# Patient Record
Sex: FEMALE | Race: BLACK OR AFRICAN AMERICAN | NOT HISPANIC OR LATINO | URBAN - METROPOLITAN AREA
[De-identification: names, ages, dates, MRNs, and addresses within clinical notes are randomized per-mention and may not be internally consistent; named-entity substitution may affect disease eponyms.]

---

## 2017-02-11 ENCOUNTER — EMERGENCY (EMERGENCY)
Facility: HOSPITAL | Age: 62
LOS: 1 days | Discharge: HOME | End: 2017-02-11
Attending: EMERGENCY MEDICINE | Admitting: EMERGENCY MEDICINE
Payer: COMMERCIAL

## 2017-02-11 VITALS
RESPIRATION RATE: 18 BRPM | DIASTOLIC BLOOD PRESSURE: 104 MMHG | TEMPERATURE: 99 F | OXYGEN SATURATION: 98 % | SYSTOLIC BLOOD PRESSURE: 153 MMHG | HEART RATE: 83 BPM

## 2017-02-11 VITALS
SYSTOLIC BLOOD PRESSURE: 117 MMHG | RESPIRATION RATE: 18 BRPM | DIASTOLIC BLOOD PRESSURE: 74 MMHG | OXYGEN SATURATION: 100 % | HEART RATE: 74 BPM | TEMPERATURE: 98 F

## 2017-02-11 DIAGNOSIS — Z90.711 ACQUIRED ABSENCE OF UTERUS WITH REMAINING CERVICAL STUMP: Chronic | ICD-10-CM

## 2017-02-11 DIAGNOSIS — Z98.891 HISTORY OF UTERINE SCAR FROM PREVIOUS SURGERY: Chronic | ICD-10-CM

## 2017-02-11 PROBLEM — Z00.00 ENCOUNTER FOR PREVENTIVE HEALTH EXAMINATION: Status: ACTIVE | Noted: 2017-02-11

## 2017-02-11 LAB
ALBUMIN SERPL ELPH-MCNC: 4.5 G/DL — SIGNIFICANT CHANGE UP (ref 3.3–5)
ALP SERPL-CCNC: 92 U/L — SIGNIFICANT CHANGE UP (ref 40–120)
ALT FLD-CCNC: 22 U/L — SIGNIFICANT CHANGE UP (ref 4–33)
AST SERPL-CCNC: 23 U/L — SIGNIFICANT CHANGE UP (ref 4–32)
BASE EXCESS BLDV CALC-SCNC: 6.8 MMOL/L — SIGNIFICANT CHANGE UP
BASOPHILS # BLD AUTO: 0.03 K/UL — SIGNIFICANT CHANGE UP (ref 0–0.2)
BASOPHILS NFR BLD AUTO: 0.5 % — SIGNIFICANT CHANGE UP (ref 0–2)
BILIRUB SERPL-MCNC: 0.3 MG/DL — SIGNIFICANT CHANGE UP (ref 0.2–1.2)
BLOOD GAS VENOUS - CREATININE: 0.82 MG/DL — SIGNIFICANT CHANGE UP (ref 0.5–1.3)
BUN SERPL-MCNC: 9 MG/DL — SIGNIFICANT CHANGE UP (ref 7–23)
CALCIUM SERPL-MCNC: 9.6 MG/DL — SIGNIFICANT CHANGE UP (ref 8.4–10.5)
CHLORIDE BLDV-SCNC: 104 MMOL/L — SIGNIFICANT CHANGE UP (ref 96–108)
CHLORIDE SERPL-SCNC: 101 MMOL/L — SIGNIFICANT CHANGE UP (ref 98–107)
CK MB BLD-MCNC: 2.39 NG/ML — SIGNIFICANT CHANGE UP (ref 1–4.7)
CK SERPL-CCNC: 228 U/L — HIGH (ref 25–170)
CO2 SERPL-SCNC: 27 MMOL/L — SIGNIFICANT CHANGE UP (ref 22–31)
CREAT SERPL-MCNC: 0.81 MG/DL — SIGNIFICANT CHANGE UP (ref 0.5–1.3)
EOSINOPHIL # BLD AUTO: 0.21 K/UL — SIGNIFICANT CHANGE UP (ref 0–0.5)
EOSINOPHIL NFR BLD AUTO: 3.8 % — SIGNIFICANT CHANGE UP (ref 0–6)
GAS PNL BLDV: 137 MMOL/L — SIGNIFICANT CHANGE UP (ref 136–146)
GLUCOSE BLDV-MCNC: 98 — SIGNIFICANT CHANGE UP (ref 70–99)
GLUCOSE SERPL-MCNC: 104 MG/DL — HIGH (ref 70–99)
HCO3 BLDV-SCNC: 27 MMOL/L — SIGNIFICANT CHANGE UP (ref 20–27)
HCT VFR BLD CALC: 41.1 % — SIGNIFICANT CHANGE UP (ref 34.5–45)
HCT VFR BLDV CALC: 43 % — SIGNIFICANT CHANGE UP (ref 34.5–45)
HGB BLD-MCNC: 13.6 G/DL — SIGNIFICANT CHANGE UP (ref 11.5–15.5)
HGB BLDV-MCNC: 14 G/DL — SIGNIFICANT CHANGE UP (ref 11.5–15.5)
IMM GRANULOCYTES NFR BLD AUTO: 0.2 % — SIGNIFICANT CHANGE UP (ref 0–1.5)
LACTATE BLDV-MCNC: 1 MMOL/L — SIGNIFICANT CHANGE UP (ref 0.5–2)
LYMPHOCYTES # BLD AUTO: 1.63 K/UL — SIGNIFICANT CHANGE UP (ref 1–3.3)
LYMPHOCYTES # BLD AUTO: 29.5 % — SIGNIFICANT CHANGE UP (ref 13–44)
MCHC RBC-ENTMCNC: 31.3 PG — SIGNIFICANT CHANGE UP (ref 27–34)
MCHC RBC-ENTMCNC: 33.1 % — SIGNIFICANT CHANGE UP (ref 32–36)
MCV RBC AUTO: 94.5 FL — SIGNIFICANT CHANGE UP (ref 80–100)
MONOCYTES # BLD AUTO: 0.42 K/UL — SIGNIFICANT CHANGE UP (ref 0–0.9)
MONOCYTES NFR BLD AUTO: 7.6 % — SIGNIFICANT CHANGE UP (ref 2–14)
NEUTROPHILS # BLD AUTO: 3.22 K/UL — SIGNIFICANT CHANGE UP (ref 1.8–7.4)
NEUTROPHILS NFR BLD AUTO: 58.4 % — SIGNIFICANT CHANGE UP (ref 43–77)
PCO2 BLDV: 60 MMHG — HIGH (ref 41–51)
PH BLDV: 7.35 PH — SIGNIFICANT CHANGE UP (ref 7.32–7.43)
PLATELET # BLD AUTO: 326 K/UL — SIGNIFICANT CHANGE UP (ref 150–400)
PMV BLD: 10.1 FL — SIGNIFICANT CHANGE UP (ref 7–13)
PO2 BLDV: < 24 MMHG — LOW (ref 35–40)
POTASSIUM BLDV-SCNC: 4 MMOL/L — SIGNIFICANT CHANGE UP (ref 3.4–4.5)
POTASSIUM SERPL-MCNC: 4.2 MMOL/L — SIGNIFICANT CHANGE UP (ref 3.5–5.3)
POTASSIUM SERPL-SCNC: 4.2 MMOL/L — SIGNIFICANT CHANGE UP (ref 3.5–5.3)
PROT SERPL-MCNC: 7.5 G/DL — SIGNIFICANT CHANGE UP (ref 6–8.3)
RBC # BLD: 4.35 M/UL — SIGNIFICANT CHANGE UP (ref 3.8–5.2)
RBC # FLD: 13.4 % — SIGNIFICANT CHANGE UP (ref 10.3–14.5)
SAO2 % BLDV: 17.5 % — LOW (ref 60–85)
SODIUM SERPL-SCNC: 141 MMOL/L — SIGNIFICANT CHANGE UP (ref 135–145)
TROPONIN T SERPL-MCNC: < 0.06 NG/ML — SIGNIFICANT CHANGE UP (ref 0–0.06)
TROPONIN T SERPL-MCNC: < 0.06 NG/ML — SIGNIFICANT CHANGE UP (ref 0–0.06)
WBC # BLD: 5.52 K/UL — SIGNIFICANT CHANGE UP (ref 3.8–10.5)
WBC # FLD AUTO: 5.52 K/UL — SIGNIFICANT CHANGE UP (ref 3.8–10.5)

## 2017-02-11 PROCEDURE — 99285 EMERGENCY DEPT VISIT HI MDM: CPT | Mod: 25

## 2017-02-11 PROCEDURE — 71020: CPT | Mod: 26

## 2017-02-11 PROCEDURE — 93010 ELECTROCARDIOGRAM REPORT: CPT

## 2017-02-11 RX ORDER — ASPIRIN/CALCIUM CARB/MAGNESIUM 324 MG
162 TABLET ORAL ONCE
Qty: 0 | Refills: 0 | Status: COMPLETED | OUTPATIENT
Start: 2017-02-11 | End: 2017-02-11

## 2017-02-11 RX ORDER — ASPIRIN/CALCIUM CARB/MAGNESIUM 324 MG
162 TABLET ORAL ONCE
Qty: 0 | Refills: 0 | Status: DISCONTINUED | OUTPATIENT
Start: 2017-02-11 | End: 2017-02-11

## 2017-02-11 RX ADMIN — Medication 162 MILLIGRAM(S): at 17:03
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
## 2017-02-11 NOTE — ED PROVIDER NOTE - CARE PLAN
Principal Discharge DX:	Upper respiratory infection  Instructions for follow-up, activity and diet:	Rest, drink plenty of fluids.  Advance activity as tolerated.  Continue all previously prescribed medications as directed.  Follow up with your primary care physician and cardiology (referral list provided) in 48-72 hours- bring copies of your results.  Return to the ER for worsening or persistent symptoms, including but not limited to shortness of breath, wheezing, worsening/persistent chest pain and/or ANY NEW OR CONCERNING SYMPTOMS. If you have issues obtaining follow up, please call: [phone] to obtain a doctor or specialist who takes your insurance in your area.  Secondary Diagnosis:	Chest pain Principal Discharge DX:	Upper respiratory infection  Instructions for follow-up, activity and diet:	Rest, drink plenty of fluids.  Advance activity as tolerated.  Continue all previously prescribed medications as directed.  Follow up with your primary care physician and cardiology (referral list provided) in 48-72 hours- bring copies of your results.  Return to the ER for worsening or persistent symptoms, including but not limited to shortness of breath, wheezing, worsening/persistent chest pain and/or ANY NEW OR CONCERNING SYMPTOMS. If you have issues obtaining follow up, please call: [phone] to obtain a doctor or specialist who takes your insurance in your area.  Secondary Diagnosis:	Chest pain

## 2017-02-11 NOTE — ED PROVIDER NOTE - CHPI ED SYMPTOMS NEG
no diaphoresis/no headache/no palpitations, lightheadedness, nausea,, no vomiting, no back pain, no neck pain, no arm pain, no calf pain or swelling/no shortness of breath/no fever/no hemoptysis/no chills

## 2017-02-11 NOTE — ED PROVIDER NOTE - MEDICAL DECISION MAKING DETAILS
60 y/o F pt non smoker with PMHx of HLD, PSHx of  section, partial hysterectomy, FHx of MI (father at 61) presents with cough, runny nose x1 week. Likely viral syndrome. Heart score of 2, Plan: labs troponin, repeat troponin in 4 hours, EKG, CXR, cardiology f/u as outpatient.

## 2017-02-11 NOTE — ED PROVIDER NOTE - PLAN OF CARE
Rest, drink plenty of fluids.  Advance activity as tolerated.  Continue all previously prescribed medications as directed.  Follow up with your primary care physician and cardiology (referral list provided) in 48-72 hours- bring copies of your results.  Return to the ER for worsening or persistent symptoms, including but not limited to shortness of breath, wheezing, worsening/persistent chest pain and/or ANY NEW OR CONCERNING SYMPTOMS. If you have issues obtaining follow up, please call: [phone] to obtain a doctor or specialist who takes your insurance in your area.

## 2017-02-11 NOTE — ED PROVIDER NOTE - PROGRESS NOTE DETAILS
TOMER Reeves:  CXR shows no acute pathology.  Cardiac enzyme negative x 2.  Pt medically stable for discharge.  Pt to follow up with PMD and cardiology (referral list provided). The scribe's documentation has been prepared under my direction and personally reviewed by me in its entirety. I confirm that the note above accurately reflects all work, treatment, procedures, and medical decision making performed by me, Oz Reeves PA-C.

## 2017-02-11 NOTE — ED PROVIDER NOTE - NS ED MD SCRIBE ATTENDING SCRIBE SECTIONS
DISPOSITION/PHYSICAL EXAM/VITAL SIGNS( Pullset)/PAST MEDICAL/SURGICAL/SOCIAL HISTORY/HISTORY OF PRESENT ILLNESS/REVIEW OF SYSTEMS

## 2017-02-11 NOTE — ED PROVIDER NOTE - ATTENDING CONTRIBUTION TO CARE
Dr Darling  62yo F hx of HLD pw a month of cough. States "I have been sick for over a month" initially productive cough now coughing w "little" sputum. no fever or chills. +nasal congestion, runny nose. saw pmd told to have a cxr, has been on zithromax and albuterol.   also complaining of midsternal chest pain, non radiating. not exertion. not associated with nausea/vomit/ numbness or weakness. last stress test was 4-5 years ago, "normal"   no recent travel hx. no leg swelling or calf pain. non smoker. +family hx of MI father.  Vital signs noted. pt no acute distress. normal S1-S2 coarse bs bl. no retractions. talks in full clear sentences. soft nt abdomen. no pedal edema. no calf tenderness. normal pulses bilateral feet.   plan ekg, cxr, labs, troponin x 2, re assess

## 2017-02-11 NOTE — ED ADULT TRIAGE NOTE - CHIEF COMPLAINT QUOTE
Pt on azithromycin for cold symptoms since Wednesday. Pt also taking mucinex, delsum for cold symptoms as well. C/o BP being high. No pmh of HTN. Pt also has burst blood vessel in right eye.

## 2017-08-21 ENCOUNTER — EMERGENCY (EMERGENCY)
Facility: HOSPITAL | Age: 62
LOS: 1 days | Discharge: HOME | End: 2017-08-21
Attending: EMERGENCY MEDICINE | Admitting: EMERGENCY MEDICINE
Payer: COMMERCIAL

## 2017-08-21 VITALS
TEMPERATURE: 99 F | RESPIRATION RATE: 18 BRPM | SYSTOLIC BLOOD PRESSURE: 124 MMHG | DIASTOLIC BLOOD PRESSURE: 84 MMHG | WEIGHT: 214.07 LBS | HEART RATE: 94 BPM | OXYGEN SATURATION: 97 %

## 2017-08-21 DIAGNOSIS — Z98.891 HISTORY OF UTERINE SCAR FROM PREVIOUS SURGERY: Chronic | ICD-10-CM

## 2017-08-21 DIAGNOSIS — N60.01 SOLITARY CYST OF RIGHT BREAST: Chronic | ICD-10-CM

## 2017-08-21 DIAGNOSIS — Z90.711 ACQUIRED ABSENCE OF UTERUS WITH REMAINING CERVICAL STUMP: Chronic | ICD-10-CM

## 2017-08-21 LAB
ALBUMIN SERPL ELPH-MCNC: 4.3 G/DL — SIGNIFICANT CHANGE UP (ref 3.3–5)
ALP SERPL-CCNC: 94 U/L — SIGNIFICANT CHANGE UP (ref 40–120)
ALT FLD-CCNC: 17 U/L RC — SIGNIFICANT CHANGE UP (ref 10–45)
ANION GAP SERPL CALC-SCNC: 12 MMOL/L — SIGNIFICANT CHANGE UP (ref 5–17)
AST SERPL-CCNC: 20 U/L — SIGNIFICANT CHANGE UP (ref 10–40)
BASOPHILS # BLD AUTO: 0.1 K/UL — SIGNIFICANT CHANGE UP (ref 0–0.2)
BASOPHILS NFR BLD AUTO: 1.1 % — SIGNIFICANT CHANGE UP (ref 0–2)
BILIRUB SERPL-MCNC: 0.2 MG/DL — SIGNIFICANT CHANGE UP (ref 0.2–1.2)
BUN SERPL-MCNC: 9 MG/DL — SIGNIFICANT CHANGE UP (ref 7–23)
CALCIUM SERPL-MCNC: 9.5 MG/DL — SIGNIFICANT CHANGE UP (ref 8.4–10.5)
CHLORIDE SERPL-SCNC: 103 MMOL/L — SIGNIFICANT CHANGE UP (ref 96–108)
CO2 SERPL-SCNC: 28 MMOL/L — SIGNIFICANT CHANGE UP (ref 22–31)
CREAT SERPL-MCNC: 0.82 MG/DL — SIGNIFICANT CHANGE UP (ref 0.5–1.3)
EOSINOPHIL # BLD AUTO: 0.2 K/UL — SIGNIFICANT CHANGE UP (ref 0–0.5)
EOSINOPHIL NFR BLD AUTO: 3.1 % — SIGNIFICANT CHANGE UP (ref 0–6)
GLUCOSE SERPL-MCNC: 100 MG/DL — HIGH (ref 70–99)
HCT VFR BLD CALC: 39.5 % — SIGNIFICANT CHANGE UP (ref 34.5–45)
HGB BLD-MCNC: 12.7 G/DL — SIGNIFICANT CHANGE UP (ref 11.5–15.5)
LYMPHOCYTES # BLD AUTO: 1.8 K/UL — SIGNIFICANT CHANGE UP (ref 1–3.3)
LYMPHOCYTES # BLD AUTO: 33 % — SIGNIFICANT CHANGE UP (ref 13–44)
MCHC RBC-ENTMCNC: 30.8 PG — SIGNIFICANT CHANGE UP (ref 27–34)
MCHC RBC-ENTMCNC: 32.2 GM/DL — SIGNIFICANT CHANGE UP (ref 32–36)
MCV RBC AUTO: 95.8 FL — SIGNIFICANT CHANGE UP (ref 80–100)
MONOCYTES # BLD AUTO: 0.6 K/UL — SIGNIFICANT CHANGE UP (ref 0–0.9)
MONOCYTES NFR BLD AUTO: 11 % — SIGNIFICANT CHANGE UP (ref 2–14)
NEUTROPHILS # BLD AUTO: 2.8 K/UL — SIGNIFICANT CHANGE UP (ref 1.8–7.4)
NEUTROPHILS NFR BLD AUTO: 51.7 % — SIGNIFICANT CHANGE UP (ref 43–77)
NT-PROBNP SERPL-SCNC: 21 PG/ML — SIGNIFICANT CHANGE UP (ref 0–300)
PLATELET # BLD AUTO: 305 K/UL — SIGNIFICANT CHANGE UP (ref 150–400)
POTASSIUM SERPL-MCNC: 4.3 MMOL/L — SIGNIFICANT CHANGE UP (ref 3.5–5.3)
POTASSIUM SERPL-SCNC: 4.3 MMOL/L — SIGNIFICANT CHANGE UP (ref 3.5–5.3)
PROT SERPL-MCNC: 7.3 G/DL — SIGNIFICANT CHANGE UP (ref 6–8.3)
RBC # BLD: 4.12 M/UL — SIGNIFICANT CHANGE UP (ref 3.8–5.2)
RBC # FLD: 12.6 % — SIGNIFICANT CHANGE UP (ref 10.3–14.5)
SODIUM SERPL-SCNC: 143 MMOL/L — SIGNIFICANT CHANGE UP (ref 135–145)
TROPONIN T SERPL-MCNC: <0.01 NG/ML — SIGNIFICANT CHANGE UP (ref 0–0.06)
WBC # BLD: 5.4 K/UL — SIGNIFICANT CHANGE UP (ref 3.8–10.5)
WBC # FLD AUTO: 5.4 K/UL — SIGNIFICANT CHANGE UP (ref 3.8–10.5)

## 2017-08-21 PROCEDURE — 93970 EXTREMITY STUDY: CPT | Mod: 26

## 2017-08-21 PROCEDURE — 93010 ELECTROCARDIOGRAM REPORT: CPT

## 2017-08-21 PROCEDURE — 99220: CPT | Mod: 25

## 2017-08-21 RX ORDER — ASPIRIN/CALCIUM CARB/MAGNESIUM 324 MG
81 TABLET ORAL DAILY
Qty: 0 | Refills: 0 | Status: DISCONTINUED | OUTPATIENT
Start: 2017-08-21 | End: 2017-08-25

## 2017-08-21 RX ADMIN — Medication 81 MILLIGRAM(S): at 18:50

## 2017-08-21 NOTE — ED PROVIDER NOTE - PHYSICAL EXAMINATION
*Gen:   in no apparent distress, AOx3  *Eyes:   PERRL, extra-occular movements intact  *HEENT:   airway patent, most mucosal membranes  *CV:   regular rate and rhythm, normal S1/S2  *Resp:   clear to auscultation bilaterally, non-labored  *Abd:   soft, non tender, no guarding  *MSK:   no musculoskeletal tenderness  *Skin:   bilat LE swelling, L ankle swelling  *Neuro:   AOx3, no focal weakness or loss of sensation

## 2017-08-21 NOTE — ED CDU PROVIDER NOTE - PLAN OF CARE
1. Rest. Hydrate. Continue medications as prescribed.  2. Follow up with your PMD in 2-3 days with copies of your results.  3. Return to ER for new or worsened chest pain, difficulty breathing, palpitations, dizziness, drenching sweats, fever, passing out or any concern. 1. Rest. Hydrate. Continue medications as prescribed.  2. Follow up with your PMD in 2-3 days. Your Hemoglobin A1c is 5.8 indicating pre-diabetes. Start a low carb/glucose diet and discuss this with your PMD. Bring copies of your results to your appointment.  3. Return to ER for new or worsened chest pain, difficulty breathing, palpitations, dizziness, drenching sweats, fever, passing out or any concern. 1. Rest. Hydrate. Continue medications as prescribed. Recommend Aspirin 81mg daily until further evaluation.   2. Follow up with your PMD in 2-3 days. Your Hemoglobin A1c is 5.8 indicating pre-diabetes. Start a low carb/glucose diet and discuss this with your PMD. Your CT scan showed incidental finding of bilateral lung nodules, a follow up CT chest is recommended in 6 months. Discuss this finding with your PMD. Bring copies of your results to your appointment.  3. Return to ER for new or worsened chest pain, difficulty breathing, palpitations, dizziness, drenching sweats, fever, passing out or any concern.

## 2017-08-21 NOTE — ED PROVIDER NOTE - NS ED ROS FT
CONST: no fevers, no chills, no lightheadedness  EYES: no pain, no vision change  HENT: atraumatic, no sore throat  CV: chest pain, no palpitations, LE edema  RESP: shortness of breath  ABD: no abdominal pain, no nausea/vomiting  : no dysuria, no hematuria  MSK: no musculoskeletal pain  NEURO: no headache, no focal weakness or loss of sensation

## 2017-08-21 NOTE — ED CDU PROVIDER NOTE - OBJECTIVE STATEMENT
60 yo F w/ pmh hld p/w bilat leg pain L > R x 1 week. Pt reports bilat leg swelling x few months that has increased in past several days, intermittent numbness and tingling L face x few months that has been unchanged. Pt reports L ankle swelling x several yrs. Pt has not taken pain meds. Pt reports sob x few months, intermittent sharp CP radiating to back x few months. Each CP episode lasts about 1 min, occurs even at rest. Pt denies fever, N/V/D. Pt finished course of outpt ab for UTI last week. Pt also reports bilat knee pain R > L x few wks. Pt had stress test and echo 2 wks ago, were normal. Denies smoking, recent travel, recent illness.     PCP: Dr. Roe Kathleen    In ED, b/l LE doppler negative. Trop x 1 negative. Will admit to CDU for serial trop/EKG and CTC.

## 2017-08-21 NOTE — ED ADULT NURSE NOTE - CHIEF COMPLAINT
The patient is a 61y Female complaining of The patient is a 61y Female complaining of swelling in feet and left face tingling

## 2017-08-21 NOTE — ED PROVIDER NOTE - PROGRESS NOTE DETAILS
Dr. Willis Shipley PGY1: US LE negative for dvt, CDU for CTA coronary study At time of signout patient accepted to CTA coronary.  Patient HD stable.    Jovan Lion MD

## 2017-08-21 NOTE — ED ADULT NURSE NOTE - OBJECTIVE STATEMENT
61 year old female, a+ox3, presents ambulatory to ED complaining of left sided facial tingling and heaviness x couple of days. denies and recent trauma. equal facial expressions, no tongue deviation, full strength in each extremity with no sensational changes. [place] stroke scale negative, just is having tingling on the left side of head. reports 2/10 headache to left side of head. Denies feeling confused, visual changes, dizziness, or lightheadedness. perrl 4mm bilaterally. reports sob on exertion.  no cough, chest pain currently. lungs cta unlabored. no coughing/fevers. reports feeling winded when she talks at times. pedal edema present for several months, however, in the last week she has noticed left leg is more swollen than right. denies calf pain. intermittent sharp pains from sternal area radiating through torso to upper back. denies the sensation currently. blood pressures in left and right arm are not drastically different.  Denies foreign travel or long car rides. abd soft nontender, nondistended. no n/v/d. skin w/d/i

## 2017-08-21 NOTE — ED CDU PROVIDER NOTE - ATTENDING CONTRIBUTION TO CARE
I have personally performed a face to face diagnostic evaluation on this patient.  I have reviewed the ACP note and agree with the history, exam, and plan of care, except as noted.  History and Exam by me shows  See Ed provider note  Tru Lewis MD, Facep

## 2017-08-21 NOTE — ED CDU PROVIDER NOTE - MEDICAL DECISION MAKING DETAILS
Adult female with intermittant cp leg swelling. Doppler le and trop neg, for ctc ro acs,cardiac monitor and reassess

## 2017-08-21 NOTE — ED PROVIDER NOTE - MEDICAL DECISION MAKING DETAILS
Adult female with cp/donnelly/sob and lower extr edema ro dvt, acs check doppler trop ekg and possible cdu for further testing  Tru Lewis MD, Facep

## 2017-08-21 NOTE — ED ADULT NURSE NOTE - PSH
History of  section    S/P partial hysterectomy Breast cyst, right    History of  section    S/P partial hysterectomy

## 2017-08-21 NOTE — ED PROVIDER NOTE - ATTENDING CONTRIBUTION TO CARE
Private Physician Jah Kathleen ([place]) [phone]  60 yo F w/ pmh hld, No dm, Hypertension, habits, travel. Pt comes to ed complains of terrance lower extr swelling worsening over past 2 week, Has several months of intermittant cp, sharp,lasts few seconds when awaking in the morning. Worse with ambulation, with donnelly  climbing 6 steps, Rests and improves, No cancer, no cough fever chills, PE WDWN female awake alert normocephalic atraumatic chest clear anterior & posterior abd soft +bs, CV no rubs, gallops or murmurs, neuro no focal defects. Extr +terrance pedal edema without tenderness warmth  Tru Lewis MD, Facep

## 2017-08-21 NOTE — ED CDU PROVIDER NOTE - PROGRESS NOTE DETAILS
CDU NOTE TOMER SHAIKH: Received pt at signout at 1900 from [place] . Case/plan reviewed. NAD VSS.  Patient resting comfortably and has no current complaints. no events on tele. NAD VSS.  Patient resting comfortably and has no current complaints. no events on tele. - Magalis Ball PA-C CDU NOTE TOMER BERUMEN: Pt resting comfortably, feeling well without complaint. NAD, VSS. No events on telemetry. CV: S1S2 RRR, Lungs: CTA B/L. pt to get CT coronary today. -Cole Charlton MD- pt with leg swelling bl, no pain or redness, long duration now noted cp exertional sob. ct coronary results pending. 1+ non pitting edema Do not suspect dvt/pe. awaiting ct coronary read. pt stable for dc CDU NOTE TOMER BERUMEN: CT called, state they gave pt 20 IV lopressor and 0.8 NTG. CDU NOTE TOMER BERUMEN: Pt resting comfortably, feeling well aside from a H/A, requesting tylenol for H/A. NAD, VSS. No events on telemetry. CTC was negative aside from incidental finding of pulm nodules, pt states she has been informed of pulm nodules in the past. pt aware she needs f/u imaging. case and plan discussed with Dr. DANYA Charlton; pt stable for discharge.

## 2017-08-21 NOTE — ED PROVIDER NOTE - OBJECTIVE STATEMENT
60 yo F w/ pmh hld p/w bilat leg pain L > R x 1 week. Pt reports bilat leg swelling x few months that has increased in past several days, intermittent numbness and tingling L face x few months that has been unchanged. Pt reports L ankle swelling x several yrs. Pt has not taken pain meds. Pt reports sob x few months, intermittent sharp CP radiating to back x few months. Pt denies fever, N/V/D. Pt finished course of outpt ab for UTI last week. Pt also reports bilat knee pain R > L x few wks.    Pt had stress test and echo 2 wks ago, were normal.    PCP: Dr. Roe Kathleen

## 2017-08-22 VITALS
SYSTOLIC BLOOD PRESSURE: 106 MMHG | RESPIRATION RATE: 17 BRPM | DIASTOLIC BLOOD PRESSURE: 76 MMHG | OXYGEN SATURATION: 98 % | TEMPERATURE: 98 F | HEART RATE: 67 BPM

## 2017-08-22 LAB
CHOLEST SERPL-MCNC: 210 MG/DL — HIGH (ref 10–199)
HBA1C BLD-MCNC: 5.8 % — HIGH (ref 4–5.6)
HDLC SERPL-MCNC: 65 MG/DL — SIGNIFICANT CHANGE UP (ref 40–125)
LIPID PNL WITH DIRECT LDL SERPL: 130 MG/DL — HIGH
TOTAL CHOLESTEROL/HDL RATIO MEASUREMENT: 3.2 RATIO — LOW (ref 3.3–7.1)
TRIGL SERPL-MCNC: 76 MG/DL — SIGNIFICANT CHANGE UP (ref 10–149)

## 2017-08-22 PROCEDURE — 80061 LIPID PANEL: CPT

## 2017-08-22 PROCEDURE — 83880 ASSAY OF NATRIURETIC PEPTIDE: CPT

## 2017-08-22 PROCEDURE — 99284 EMERGENCY DEPT VISIT MOD MDM: CPT | Mod: 25

## 2017-08-22 PROCEDURE — 75574 CT ANGIO HRT W/3D IMAGE: CPT | Mod: 26

## 2017-08-22 PROCEDURE — 93970 EXTREMITY STUDY: CPT

## 2017-08-22 PROCEDURE — 75574 CT ANGIO HRT W/3D IMAGE: CPT

## 2017-08-22 PROCEDURE — 80053 COMPREHEN METABOLIC PANEL: CPT

## 2017-08-22 PROCEDURE — 85027 COMPLETE CBC AUTOMATED: CPT

## 2017-08-22 PROCEDURE — 84484 ASSAY OF TROPONIN QUANT: CPT

## 2017-08-22 PROCEDURE — G0378: CPT

## 2017-08-22 PROCEDURE — 93005 ELECTROCARDIOGRAM TRACING: CPT | Mod: XU

## 2017-08-22 PROCEDURE — 93010 ELECTROCARDIOGRAM REPORT: CPT | Mod: 77

## 2017-08-22 PROCEDURE — 83036 HEMOGLOBIN GLYCOSYLATED A1C: CPT

## 2017-08-22 PROCEDURE — 99217: CPT | Mod: 25

## 2017-08-22 RX ORDER — ACETAMINOPHEN 500 MG
650 TABLET ORAL ONCE
Qty: 0 | Refills: 0 | Status: COMPLETED | OUTPATIENT
Start: 2017-08-22 | End: 2017-08-22

## 2017-08-22 RX ADMIN — Medication 650 MILLIGRAM(S): at 11:59

## 2017-08-22 NOTE — ED ADULT NURSE REASSESSMENT NOTE - NS ED NURSE REASSESS COMMENT FT1
awaiting for acceptance into CDU
back from us. vss. will cont to monitor. dispo pending
dispo pending
report taken from Rufina MILES
Pt received from GINGER Bradford. Pt oriented to CDU & plan of care was discussed. Pt denies chest pain, SOB, dizziness or palpitations. Safety & comfort measures maintained. Call bell in reach. Will continue to monitor.

## 2018-07-22 ENCOUNTER — TRANSCRIPTION ENCOUNTER (OUTPATIENT)
Age: 63
End: 2018-07-22

## 2018-11-23 NOTE — ED PROVIDER NOTE - TEMPLATE, MLM
Ventricular Rate : 73   Atrial Rate : 144   P-R Interval : 187   QRS Duration : 124   Q-T Interval : 373   QTC Calculation(Bezet) : 411   P Axis : -1   R Axis : -31   T Axis : 116   Diagnosis : Atrial fibrillation~Left bundle branch block~~Confirmed by Elkin Chavira ([id number]) on 8/3/2017 8:42:24 PM     
Respiratory

## 2019-06-10 NOTE — ED ADULT NURSE NOTE - ED STAT RN HANDOFF TIME
----- Message from Opal Marcano MD sent at 6/10/2019  5:12 AM CDT -----  Please advise patient that ultrasound shows   Benign cysts of the cervix, right ovary with a dominant follicle, cyst, this can occur with ovulation, where one follicle becomes large and causes pain. There are no tumors in the uterus, left ovary shows follicles as well. Essentially the follicles could be causing her pain when she ovulates but we did not find any concerning findings.  Opal Marcano MD   18:48

## 2019-07-03 ENCOUNTER — EMERGENCY (EMERGENCY)
Facility: HOSPITAL | Age: 64
LOS: 1 days | Discharge: HOME | End: 2019-07-03
Attending: EMERGENCY MEDICINE | Admitting: EMERGENCY MEDICINE
Payer: COMMERCIAL

## 2019-07-03 VITALS
HEART RATE: 82 BPM | DIASTOLIC BLOOD PRESSURE: 84 MMHG | SYSTOLIC BLOOD PRESSURE: 139 MMHG | RESPIRATION RATE: 17 BRPM | OXYGEN SATURATION: 100 %

## 2019-07-03 VITALS
SYSTOLIC BLOOD PRESSURE: 141 MMHG | HEART RATE: 95 BPM | RESPIRATION RATE: 18 BRPM | DIASTOLIC BLOOD PRESSURE: 100 MMHG | TEMPERATURE: 98 F | OXYGEN SATURATION: 100 %

## 2019-07-03 DIAGNOSIS — Z98.891 HISTORY OF UTERINE SCAR FROM PREVIOUS SURGERY: Chronic | ICD-10-CM

## 2019-07-03 DIAGNOSIS — Z90.711 ACQUIRED ABSENCE OF UTERUS WITH REMAINING CERVICAL STUMP: Chronic | ICD-10-CM

## 2019-07-03 DIAGNOSIS — N60.01 SOLITARY CYST OF RIGHT BREAST: Chronic | ICD-10-CM

## 2019-07-03 PROBLEM — E78.5 HYPERLIPIDEMIA, UNSPECIFIED: Chronic | Status: ACTIVE | Noted: 2017-02-11

## 2019-07-03 LAB
ANION GAP SERPL CALC-SCNC: 12 MMO/L — SIGNIFICANT CHANGE UP (ref 7–14)
BUN SERPL-MCNC: 9 MG/DL — SIGNIFICANT CHANGE UP (ref 7–23)
CALCIUM SERPL-MCNC: 9.8 MG/DL — SIGNIFICANT CHANGE UP (ref 8.4–10.5)
CHLORIDE SERPL-SCNC: 100 MMOL/L — SIGNIFICANT CHANGE UP (ref 98–107)
CO2 SERPL-SCNC: 28 MMOL/L — SIGNIFICANT CHANGE UP (ref 22–31)
CREAT SERPL-MCNC: 0.76 MG/DL — SIGNIFICANT CHANGE UP (ref 0.5–1.3)
GLUCOSE SERPL-MCNC: 130 MG/DL — HIGH (ref 70–99)
HCT VFR BLD CALC: 39.1 % — SIGNIFICANT CHANGE UP (ref 34.5–45)
HGB BLD-MCNC: 12.7 G/DL — SIGNIFICANT CHANGE UP (ref 11.5–15.5)
MCHC RBC-ENTMCNC: 30 PG — SIGNIFICANT CHANGE UP (ref 27–34)
MCHC RBC-ENTMCNC: 32.5 % — SIGNIFICANT CHANGE UP (ref 32–36)
MCV RBC AUTO: 92.2 FL — SIGNIFICANT CHANGE UP (ref 80–100)
NRBC # FLD: 0 K/UL — SIGNIFICANT CHANGE UP (ref 0–0)
PLATELET # BLD AUTO: 292 K/UL — SIGNIFICANT CHANGE UP (ref 150–400)
PMV BLD: 9.6 FL — SIGNIFICANT CHANGE UP (ref 7–13)
POTASSIUM SERPL-MCNC: 4.2 MMOL/L — SIGNIFICANT CHANGE UP (ref 3.5–5.3)
POTASSIUM SERPL-SCNC: 4.2 MMOL/L — SIGNIFICANT CHANGE UP (ref 3.5–5.3)
RBC # BLD: 4.24 M/UL — SIGNIFICANT CHANGE UP (ref 3.8–5.2)
RBC # FLD: 14.1 % — SIGNIFICANT CHANGE UP (ref 10.3–14.5)
SODIUM SERPL-SCNC: 140 MMOL/L — SIGNIFICANT CHANGE UP (ref 135–145)
WBC # BLD: 5.84 K/UL — SIGNIFICANT CHANGE UP (ref 3.8–10.5)
WBC # FLD AUTO: 5.84 K/UL — SIGNIFICANT CHANGE UP (ref 3.8–10.5)

## 2019-07-03 PROCEDURE — 99284 EMERGENCY DEPT VISIT MOD MDM: CPT

## 2019-07-03 PROCEDURE — 70450 CT HEAD/BRAIN W/O DYE: CPT | Mod: 26

## 2019-07-03 RX ORDER — KETOROLAC TROMETHAMINE 30 MG/ML
15 SYRINGE (ML) INJECTION ONCE
Refills: 0 | Status: DISCONTINUED | OUTPATIENT
Start: 2019-07-03 | End: 2019-07-03

## 2019-07-03 RX ORDER — METOCLOPRAMIDE HCL 10 MG
10 TABLET ORAL ONCE
Refills: 0 | Status: COMPLETED | OUTPATIENT
Start: 2019-07-03 | End: 2019-07-03

## 2019-07-03 RX ADMIN — Medication 15 MILLIGRAM(S): at 02:32

## 2019-07-03 RX ADMIN — Medication 10 MILLIGRAM(S): at 02:33

## 2019-07-03 NOTE — ED PROVIDER NOTE - ATTENDING CONTRIBUTION TO CARE
Afebrile. Awake and Alert. Lungs CTA. Heart RRR. Neck supple. No sinus TTP. No temporal TTP. Neurologic exam: A&O x3, speech clear, MARTHA, CN II-XII intact, motor strength +5/5 in all extremities, sensation equal bilaterally, finger-to-nose normal, gait steady.    HA intermittent and gradual onset with non-focal neuro exam  CT Head r/o malignancy/sinus infection given different character of HA  Supportive medications for headache

## 2019-07-03 NOTE — ED ADULT NURSE NOTE - OBJECTIVE STATEMENT
pt c/o posterior headache since this morning, unrelieved by home pain medication- pt denies any visual changes, n/v/d, dizziness, weakness, CP, SOB- denies any PMH of HTN- appears in NAD, vitals as noted. IV 20g placed to R. AC, labs sent, medicated as ordered. MD at bedside, awaiting further orders from MD, will continue to monitor, pt safety maintained.

## 2019-07-03 NOTE — ED ADULT TRIAGE NOTE - CHIEF COMPLAINT QUOTE
headache/hi bp    pt c/o pain to back of head to left cheek and slight eye blurriness for 1 day.  states bp has been running higher lately but not officially diagnosed with htn.  denies dizziness, nausea, weakness, numbness, sweating, cp, sob.  only hx of hi chol.  took 81mg asa at 8pm

## 2019-07-03 NOTE — ED PROVIDER NOTE - NSFOLLOWUPINSTRUCTIONS_ED_ALL_ED_FT
Follow up with your primary physician in 2-3 days. If needed call [phone] to find a primary care physician or call  [phone] to schedule an appointment with the general medicine.    You can take acetaminophen (aka tylenol, 1000 mg every 6-8 hours) or ibuprofen (600 mg every 6-8 hours) for pain. Do not exceed 4000 mg acetaminophen (tylenol) in a 24 hour period. Be aware if any of your other medications contain acetaminophen so as not to exceed the maximum daily dose.    Return to the ER for severe pain, vomiting, weakness, numbness, dizziness, or any other new concerning symptoms.

## 2019-07-03 NOTE — ED PROVIDER NOTE - PHYSICAL EXAMINATION
General: A&Ox3, well nourished, no acute distress  HENT: NC/AT. Posterior oropharynx clear. Patent airway  Eyes: PERRL, EOMI  CV: RRR, no m/r/g. 2+ peripheral pulses. Extremities are warm and well perfused.  Respiratory: CTAB no w/r/r. No respiratory distress.  Abdominal: soft, non-distended, non-tender, no rebound, guarding, or rigidity  Neuro: No focal deficits. CN II-XII intact. Normal strength and sensation x4 extremities. Normal stance and gait. Negative Romberg. Normal FNF. No pronator drift.  Skin: no rashes  Psych: normal mood and affect

## 2019-07-03 NOTE — ED ADULT NURSE REASSESSMENT NOTE - NS ED NURSE REASSESS COMMENT FT1
report received from Nataly MILES, pt a&Ox3, VSS, breathing even & unlabored,states she "feels much better." pt denies headache, dizziness, blurry vision, cp, sob. will continue to monitor,.

## 2019-07-03 NOTE — ED PROVIDER NOTE - OBJECTIVE STATEMENT
63F hx HLD p/w a headache for 3 days. Initially had an intermittent headache, but for the last day has experienced a constant posterior head pressure. No weakness/numbness/gait imbalance/neck pain/dizziness. Notes blurry vision for months, unchanged in the acute setting.

## 2019-07-03 NOTE — ED PROVIDER NOTE - CLINICAL SUMMARY MEDICAL DECISION MAKING FREE TEXT BOX
Jonathan Weil, PGY2 - most likely a benign etiology, will obtain CT head given age and atypical nature compared to prior headaches.

## 2019-07-03 NOTE — ED PROVIDER NOTE - PROGRESS NOTE DETAILS
Jonathan Weil, PGY2 - Pt seen & reassessed. Remains clinically stable. Headache is improved and she feels much better overall. CT negative. The patient feels comfortable going home at this juncture. Results from today's visit were reviewed with the patient and a copy of the results provided for their records. We discussed the plan for home care, the need for outpatient follow up, and return precautions. The patient expressed understanding of and agreement with the plan. All questions were addressed.